# Patient Record
Sex: MALE | Race: WHITE | Employment: FULL TIME | URBAN - NONMETROPOLITAN AREA
[De-identification: names, ages, dates, MRNs, and addresses within clinical notes are randomized per-mention and may not be internally consistent; named-entity substitution may affect disease eponyms.]

---

## 2018-10-24 ENCOUNTER — OFFICE VISIT (OUTPATIENT)
Dept: PRIMARY CARE CLINIC | Age: 23
End: 2018-10-24
Payer: COMMERCIAL

## 2018-10-24 ENCOUNTER — HOSPITAL ENCOUNTER (OUTPATIENT)
Dept: GENERAL RADIOLOGY | Age: 23
Discharge: HOME | End: 2018-10-26
Payer: COMMERCIAL

## 2018-10-24 ENCOUNTER — HOSPITAL ENCOUNTER (OUTPATIENT)
Age: 23
Discharge: HOME | End: 2018-10-26
Payer: COMMERCIAL

## 2018-10-24 VITALS
SYSTOLIC BLOOD PRESSURE: 124 MMHG | BODY MASS INDEX: 18.88 KG/M2 | HEIGHT: 76 IN | WEIGHT: 155 LBS | TEMPERATURE: 97.3 F | HEART RATE: 79 BPM | OXYGEN SATURATION: 96 % | DIASTOLIC BLOOD PRESSURE: 68 MMHG

## 2018-10-24 DIAGNOSIS — S69.91XA INJURY OF RIGHT WRIST, INITIAL ENCOUNTER: Primary | ICD-10-CM

## 2018-10-24 DIAGNOSIS — S69.91XA INJURY OF RIGHT WRIST, INITIAL ENCOUNTER: ICD-10-CM

## 2018-10-24 PROCEDURE — 73110 X-RAY EXAM OF WRIST: CPT

## 2018-10-24 PROCEDURE — 99213 OFFICE O/P EST LOW 20 MIN: CPT | Performed by: FAMILY MEDICINE

## 2018-10-24 ASSESSMENT — ENCOUNTER SYMPTOMS
GASTROINTESTINAL NEGATIVE: 1
EYES NEGATIVE: 1
RESPIRATORY NEGATIVE: 1
ALLERGIC/IMMUNOLOGIC NEGATIVE: 1

## 2018-10-24 ASSESSMENT — PATIENT HEALTH QUESTIONNAIRE - PHQ9
SUM OF ALL RESPONSES TO PHQ9 QUESTIONS 1 & 2: 0
1. LITTLE INTEREST OR PLEASURE IN DOING THINGS: 0
SUM OF ALL RESPONSES TO PHQ QUESTIONS 1-9: 0
SUM OF ALL RESPONSES TO PHQ QUESTIONS 1-9: 0
2. FEELING DOWN, DEPRESSED OR HOPELESS: 0

## 2019-03-30 ENCOUNTER — HOSPITAL ENCOUNTER (EMERGENCY)
Age: 24
Discharge: HOME | End: 2019-03-30
Attending: EMERGENCY MEDICINE

## 2019-03-30 ENCOUNTER — APPOINTMENT (OUTPATIENT)
Dept: GENERAL RADIOLOGY | Age: 24
End: 2019-03-30

## 2019-03-30 VITALS
TEMPERATURE: 98.4 F | DIASTOLIC BLOOD PRESSURE: 79 MMHG | SYSTOLIC BLOOD PRESSURE: 130 MMHG | BODY MASS INDEX: 18.27 KG/M2 | OXYGEN SATURATION: 97 % | RESPIRATION RATE: 18 BRPM | WEIGHT: 150 LBS | HEIGHT: 76 IN | HEART RATE: 82 BPM

## 2019-03-30 DIAGNOSIS — S62.001A CLOSED NONDISPLACED FRACTURE OF SCAPHOID OF RIGHT WRIST, UNSPECIFIED PORTION OF SCAPHOID, INITIAL ENCOUNTER: Primary | ICD-10-CM

## 2019-03-30 PROCEDURE — 29125 APPL SHORT ARM SPLINT STATIC: CPT

## 2019-03-30 PROCEDURE — 73110 X-RAY EXAM OF WRIST: CPT

## 2019-03-30 PROCEDURE — 99283 EMERGENCY DEPT VISIT LOW MDM: CPT

## 2019-03-30 PROCEDURE — 6370000000 HC RX 637 (ALT 250 FOR IP): Performed by: EMERGENCY MEDICINE

## 2019-03-30 PROCEDURE — 2709999900 HC NON-CHARGEABLE SUPPLY

## 2019-03-30 RX ORDER — ACETAMINOPHEN 500 MG
1000 TABLET ORAL ONCE
Status: COMPLETED | OUTPATIENT
Start: 2019-03-30 | End: 2019-03-30

## 2019-03-30 RX ADMIN — ACETAMINOPHEN 1000 MG: 500 TABLET, FILM COATED ORAL at 18:38

## 2019-03-30 ASSESSMENT — PAIN SCALES - GENERAL
PAINLEVEL_OUTOF10: 9
PAINLEVEL_OUTOF10: 9

## 2019-03-30 ASSESSMENT — PAIN DESCRIPTION - PAIN TYPE: TYPE: ACUTE PAIN

## 2019-03-30 NOTE — ED TRIAGE NOTES
Pt presents ambulatory with complaints of tripping while going upstairs and caught his fall with his right hand and he thinks he injured his right wrist. Pt is able to wiggle his fingers. Pt is having a lot of pain. Right wrist is swollen and bruised. Pt alert and oriented. Skin pink warm and dry.

## 2019-03-30 NOTE — ED NOTES
Sugar tong splint applied to right arm. +CSM pre and post splint application. Patient tolerated well.      Samia Tobin RN  03/30/19 [id number]

## 2019-03-30 NOTE — ED PROVIDER NOTES
[facility]  [address]  Phone: [phone]    eMERGENCY dEPARTMENT eNCOUnter           [facility]       Chief Complaint   Patient presents with    Wrist Injury     right       Nurses Notes reviewed and I agree except as noted in the HPI. HISTORY OF PRESENT ILLNESS    Vaughn Reed is a 24 y.o. male who presented via private vehicle with the chief complaint mentioned above. He stated that he slipped and fell on his porch yesterday at home. He landed on his right wrist.  He is complaining of moderate sharp, intermittent pain over the radial aspect of the right wrist.  His pain is worse with wrist movement. Pain not radiating anywhere. He denies finger weakness or numbness. REVIEW OF SYSTEMS     . He denies head, neck, back, or hip injury. PAST MEDICAL HISTORY    has no past medical history on file. SURGICAL HISTORY      has a past surgical history that includes Finger surgery and Dental surgery. CURRENT MEDICATIONS       Previous Medications    No medications on file       ALLERGIES     has No Known Allergies. FAMILY HISTORY     has no family status information on file. family history is not on file. SOCIAL HISTORY      reports that he has been smoking cigarettes. He has been smoking about 1.00 pack per day. He has never used smokeless tobacco. He reports that he drinks alcohol. He reports that he does not use drugs. PHYSICAL EXAM     INITIAL VITALS:  height is 6' 4\" (1.93 m) and weight is 150 lb (68 kg). His oral temperature is 98.2 °F (36.8 °C). His blood pressure is 129/78 and his pulse is 74. His respiration is 16 and oxygen saturation is 97%. Physical Exam   Constitutional: He appears well-developed and well-nourished. HENT:   Head: Atraumatic. Musculoskeletal:   Examination of his right upper extremity showed mild swelling and tenderness of the  distal radius. .  There is no laceration.   He has normal neurovascular examination of right hand. Neurological: He is alert. DIAGNOSTIC RESULTS     RADIOLOGY:   Right wrist x-ray showed nondisplaced scaphoid fracture. LABS:   Labs Reviewed - No data to display    EMERGENCY DEPARTMENT COURSE:   Vitals:    Vitals:    03/30/19 1823   BP: 129/78   Pulse: 74   Resp: 16   Temp: 98.2 °F (36.8 °C)   TempSrc: Oral   SpO2: 97%   Weight: 150 lb (68 kg)   Height: 6' 4\" (1.93 m)     Patient received ice pack,  sugar tong splint to his right wrist.  Neurovascular examination of his right hand, fingers after splint application was normal.    FINAL IMPRESSION      1. Closed nondisplaced fracture of scaphoid of right wrist, unspecified portion of scaphoid, initial encounter          DISPOSITION/PLAN   He was discharged home, stable condition, he was given discharge instructions and was advised to return if worse or new symptoms.     PATIENT REFERRED TO:  Arabella Bustamante MD  [place] [address], [address] [place] [address]  [phone]            DISCHARGE MEDICATIONS:  New Prescriptions    No medications on file       (Please note that portions of this note were completed with a voice recognition program.  Efforts were made to edit the dictations but occasionally words are mis-transcribed.)    MD Raphael Gama MD  03/30/19 Fazal Chen MD  03/30/19 [id number]

## 2019-03-30 NOTE — ED NOTES
Dr. Randal Montenegro in to discuss radiology results with patient.      Janeen Myles RN  03/30/19 0671

## 2019-03-31 NOTE — ED NOTES
Discharged home in stable condition. AVS discussed/reviewed with patient. Patient instructed to return to [facility] on Tuesday, 4/2/19, at 1230 for an appointment with ortho. Patient verbalized understanding of all instructions given; no questions/concerns voiced. Respirations easy, regular and non-labored; no distress noted. Skin pink, warm an dry; mucous membranes moist. Alert and appropriate for age. Ambulated with his significant other to private vehicle.      Jude Bond RN  03/30/19 2000

## 2019-04-09 ENCOUNTER — HOSPITAL ENCOUNTER (OUTPATIENT)
Dept: CT IMAGING | Age: 24
Discharge: HOME | End: 2019-04-09

## 2019-04-09 DIAGNOSIS — S62.014S: ICD-10-CM

## 2019-04-09 PROCEDURE — 73200 CT UPPER EXTREMITY W/O DYE: CPT

## 2019-04-30 ENCOUNTER — HOSPITAL ENCOUNTER (OUTPATIENT)
Age: 24
Discharge: HOME | End: 2019-04-30

## 2019-04-30 ENCOUNTER — HOSPITAL ENCOUNTER (OUTPATIENT)
Dept: GENERAL RADIOLOGY | Age: 24
Discharge: HOME | End: 2019-04-30

## 2019-04-30 DIAGNOSIS — R52 PAIN: ICD-10-CM

## 2019-04-30 PROCEDURE — 73110 X-RAY EXAM OF WRIST: CPT

## 2019-12-23 ENCOUNTER — OFFICE VISIT (OUTPATIENT)
Dept: PRIMARY CARE CLINIC | Age: 24
End: 2019-12-23
Payer: COMMERCIAL

## 2019-12-23 ENCOUNTER — HOSPITAL ENCOUNTER (OUTPATIENT)
Dept: GENERAL RADIOLOGY | Age: 24
Discharge: HOME | End: 2019-12-25
Payer: COMMERCIAL

## 2019-12-23 ENCOUNTER — HOSPITAL ENCOUNTER (OUTPATIENT)
Age: 24
Discharge: HOME | End: 2019-12-25
Payer: COMMERCIAL

## 2019-12-23 VITALS
BODY MASS INDEX: 18.88 KG/M2 | OXYGEN SATURATION: 96 % | HEART RATE: 88 BPM | TEMPERATURE: 98 F | SYSTOLIC BLOOD PRESSURE: 116 MMHG | DIASTOLIC BLOOD PRESSURE: 76 MMHG | HEIGHT: 76 IN | WEIGHT: 155 LBS

## 2019-12-23 DIAGNOSIS — M25.572 ACUTE LEFT ANKLE PAIN: ICD-10-CM

## 2019-12-23 DIAGNOSIS — S93.402A SPRAIN OF LEFT ANKLE, UNSPECIFIED LIGAMENT, INITIAL ENCOUNTER: Primary | ICD-10-CM

## 2019-12-23 PROCEDURE — 99213 OFFICE O/P EST LOW 20 MIN: CPT | Performed by: PHYSICIAN ASSISTANT

## 2019-12-23 PROCEDURE — L1902 AFO ANKLE GAUNTLET PRE OTS: HCPCS | Performed by: PHYSICIAN ASSISTANT

## 2019-12-23 PROCEDURE — 73610 X-RAY EXAM OF ANKLE: CPT

## 2019-12-23 ASSESSMENT — ENCOUNTER SYMPTOMS: RESPIRATORY NEGATIVE: 1

## 2020-07-08 ENCOUNTER — HOSPITAL ENCOUNTER (OUTPATIENT)
Dept: ULTRASOUND IMAGING | Age: 25
Discharge: HOME | End: 2020-07-08
Payer: COMMERCIAL

## 2020-07-08 PROCEDURE — 76536 US EXAM OF HEAD AND NECK: CPT

## 2021-06-08 PROBLEM — F41.9 ANXIETY DISORDER: Status: ACTIVE | Noted: 2021-06-08
